# Patient Record
Sex: FEMALE | Race: WHITE | NOT HISPANIC OR LATINO | Employment: FULL TIME | URBAN - METROPOLITAN AREA
[De-identification: names, ages, dates, MRNs, and addresses within clinical notes are randomized per-mention and may not be internally consistent; named-entity substitution may affect disease eponyms.]

---

## 2017-02-17 ENCOUNTER — OFFICE VISIT (OUTPATIENT)
Dept: FAMILY MEDICINE CLINIC | Facility: CLINIC | Age: 42
End: 2017-02-17

## 2017-02-17 VITALS
TEMPERATURE: 97.6 F | WEIGHT: 293 LBS | HEART RATE: 94 BPM | HEIGHT: 70 IN | SYSTOLIC BLOOD PRESSURE: 140 MMHG | DIASTOLIC BLOOD PRESSURE: 88 MMHG | BODY MASS INDEX: 41.95 KG/M2 | RESPIRATION RATE: 20 BRPM | OXYGEN SATURATION: 96 %

## 2017-02-17 DIAGNOSIS — J30.1 SEASONAL ALLERGIC RHINITIS DUE TO POLLEN: ICD-10-CM

## 2017-02-17 DIAGNOSIS — J45.41 MODERATE PERSISTENT ASTHMA WITH ACUTE EXACERBATION: Primary | ICD-10-CM

## 2017-02-17 DIAGNOSIS — E78.2 MIXED HYPERLIPIDEMIA: ICD-10-CM

## 2017-02-17 DIAGNOSIS — I82.402 DEEP VEIN THROMBOSIS (DVT) OF LEFT LOWER EXTREMITY, UNSPECIFIED CHRONICITY, UNSPECIFIED VEIN (HCC): ICD-10-CM

## 2017-02-17 PROBLEM — J45.40 MODERATE PERSISTENT ASTHMA: Status: ACTIVE | Noted: 2017-02-17

## 2017-02-17 PROBLEM — I26.99 ACUTE PULMONARY EMBOLISM (HCC): Status: ACTIVE | Noted: 2017-02-17

## 2017-02-17 PROCEDURE — 99204 OFFICE O/P NEW MOD 45 MIN: CPT | Performed by: INTERNAL MEDICINE

## 2017-02-17 RX ORDER — ALBUTEROL SULFATE 90 UG/1
2 AEROSOL, METERED RESPIRATORY (INHALATION) EVERY 4 HOURS PRN
Qty: 1 INHALER | Refills: 3
Start: 2017-02-17

## 2017-02-17 RX ORDER — ACETAMINOPHEN 325 MG/1
650 TABLET ORAL EVERY 6 HOURS PRN
COMMUNITY

## 2017-02-17 NOTE — PROGRESS NOTES
Subjective   Rosio Jones is a 41 y.o. female.     History of Present Illness   Patient was seen as follow-up from hospital visit for severe asthma.  Overall records were obtained.  She was given a albuterol inhaler and asked to follow-up in our office.  Patient was stable and not wheezing at present time and pulmonary function test she will follow-up 4 days after testing.    She was on birth control pills and had a DVT with PE 10 years ago but has not had problems since.  He does have seasonal allergies and also pretty severe at times and is being set up with an allergist.    Much of this encounter note is an electronic transcription/translation of spoken language to printed text.  The electronic translation of spoken language may permit erroneous, or at times, nonsensical words or phrases to be inadvertently transcribed.  Although I have reviewed the note for such errors, some may still exist.  The following portions of the patient's history were reviewed and updated as appropriate: allergies, current medications, past family history, past medical history, past social history, past surgical history and problem list.    Review of Systems   Constitutional: Negative for fatigue and fever.   HENT: Positive for congestion. Negative for trouble swallowing.    Eyes: Negative for discharge and visual disturbance.   Respiratory: Positive for shortness of breath and wheezing. Negative for choking.    Cardiovascular: Negative for chest pain and palpitations.   Gastrointestinal: Negative for abdominal pain and blood in stool.   Endocrine: Negative.    Genitourinary: Negative for genital sores and hematuria.   Musculoskeletal: Negative for gait problem and joint swelling.   Skin: Negative for color change, pallor, rash and wound.   Allergic/Immunologic: Positive for environmental allergies. Negative for immunocompromised state.   Neurological: Negative for facial asymmetry and speech difficulty.   Psychiatric/Behavioral:  Negative for hallucinations and suicidal ideas.       Objective   Physical Exam   Constitutional: She is oriented to person, place, and time. She appears well-developed and well-nourished. No distress.   HENT:   Head: Normocephalic and atraumatic.   Right Ear: External ear normal.   Left Ear: External ear normal.   Nose: Nose normal.   Mouth/Throat: Oropharynx is clear and moist. No oropharyngeal exudate.   Eyes: Conjunctivae and EOM are normal. Pupils are equal, round, and reactive to light. Right eye exhibits no discharge. Left eye exhibits no discharge. No scleral icterus.   Neck: Normal range of motion. Neck supple. No JVD present. No tracheal deviation present. No thyromegaly present.   Cardiovascular: Normal rate, regular rhythm and normal heart sounds.  Exam reveals no gallop and no friction rub.    No murmur heard.  Pulmonary/Chest: Effort normal and breath sounds normal. No stridor. No respiratory distress. She has no wheezes. She has no rales. She exhibits no tenderness.   Abdominal: Soft. Bowel sounds are normal. She exhibits no distension and no mass. There is no rebound and no guarding. No hernia.   Musculoskeletal: Normal range of motion. She exhibits no edema, tenderness or deformity.   Lymphadenopathy:     She has no cervical adenopathy.   Neurological: She is alert and oriented to person, place, and time. She has normal reflexes. She displays normal reflexes. No cranial nerve deficit. She exhibits normal muscle tone. Coordination normal.   Skin: Skin is warm and dry. No rash noted. She is not diaphoretic. No erythema. No pallor.   Psychiatric: She has a normal mood and affect. Her behavior is normal. Judgment and thought content normal.   Nursing note and vitals reviewed.      Assessment/Plan   Problems Addressed this Visit        Cardiovascular and Mediastinum    Mixed hyperlipidemia    Relevant Orders    CBC & Differential    Comprehensive Metabolic Panel    Lipid Panel       Respiratory     Moderate persistent asthma - Primary    Relevant Medications    albuterol (PROVENTIL HFA;VENTOLIN HFA) 108 (90 BASE) MCG/ACT inhaler    Other Relevant Orders    CBC & Differential    Comprehensive Metabolic Panel    Lipid Panel    Ambulatory Referral to Allergy (Completed)    Ambulatory Referral to Pulmonology (Completed)       Other    Deep vein thrombosis (DVT) of left lower extremity    Relevant Orders    CBC & Differential    Comprehensive Metabolic Panel    Lipid Panel      Other Visit Diagnoses     Seasonal allergic rhinitis due to pollen        Relevant Orders    Ambulatory Referral to Allergy (Completed)

## 2017-02-25 ENCOUNTER — LAB (OUTPATIENT)
Dept: FAMILY MEDICINE CLINIC | Facility: CLINIC | Age: 42
End: 2017-02-25

## 2017-02-25 DIAGNOSIS — E78.2 MIXED HYPERLIPIDEMIA: ICD-10-CM

## 2017-02-25 DIAGNOSIS — J45.41 MODERATE PERSISTENT ASTHMA WITH ACUTE EXACERBATION: ICD-10-CM

## 2017-02-25 DIAGNOSIS — I82.402 DEEP VEIN THROMBOSIS (DVT) OF LEFT LOWER EXTREMITY, UNSPECIFIED CHRONICITY, UNSPECIFIED VEIN (HCC): ICD-10-CM

## 2017-02-25 LAB
ALBUMIN SERPL-MCNC: 3.9 G/DL (ref 3.5–5.2)
ALBUMIN/GLOB SERPL: 1.3 G/DL
ALP SERPL-CCNC: 112 U/L (ref 39–117)
ALT SERPL W P-5'-P-CCNC: 37 U/L (ref 1–33)
ANION GAP SERPL CALCULATED.3IONS-SCNC: 12.9 MMOL/L
AST SERPL-CCNC: 38 U/L (ref 1–32)
BILIRUB SERPL-MCNC: 0.3 MG/DL (ref 0.1–1.2)
BUN BLD-MCNC: 11 MG/DL (ref 6–20)
BUN/CREAT SERPL: 18.3 (ref 7–25)
CALCIUM SPEC-SCNC: 9.6 MG/DL (ref 8.6–10.5)
CHLORIDE SERPL-SCNC: 104 MMOL/L (ref 98–107)
CHOLEST SERPL-MCNC: 301 MG/DL (ref 0–200)
CO2 SERPL-SCNC: 26.1 MMOL/L (ref 22–29)
CREAT BLD-MCNC: 0.6 MG/DL (ref 0.57–1)
ERYTHROCYTE [DISTWIDTH] IN BLOOD BY AUTOMATED COUNT: 17.3 % (ref 4.5–15)
GFR SERPL CREATININE-BSD FRML MDRD: 109 ML/MIN/1.73
GLOBULIN UR ELPH-MCNC: 3.1 GM/DL
GLUCOSE BLD-MCNC: 139 MG/DL (ref 65–99)
HCT VFR BLD AUTO: 36.3 % (ref 31–42)
HDLC SERPL-MCNC: 55 MG/DL (ref 40–60)
HGB BLD-MCNC: 11.4 G/DL (ref 12–18)
LDLC SERPL CALC-MCNC: 191 MG/DL (ref 0–100)
LDLC/HDLC SERPL: 3.47 {RATIO}
LYMPHOCYTES # BLD AUTO: 3.1 10*3/MM3 (ref 1.2–3.4)
LYMPHOCYTES NFR BLD AUTO: 37.8 % (ref 21–51)
MCH RBC QN AUTO: 26.1 PG (ref 26.1–33.1)
MCHC RBC AUTO-ENTMCNC: 31.3 G/DL (ref 33–37)
MCV RBC AUTO: 83.4 FL (ref 80–99)
MONOCYTES # BLD AUTO: 0.5 10*3/MM3 (ref 0.1–0.6)
MONOCYTES NFR BLD AUTO: 5.8 % (ref 2–9)
NEUTROPHILS # BLD AUTO: 4.6 10*3/MM3 (ref 1.4–6.5)
NEUTROPHILS NFR BLD AUTO: 56.4 % (ref 42–75)
PLATELET # BLD AUTO: 415 10*3/MM3 (ref 150–450)
PMV BLD AUTO: 6.8 FL (ref 7.1–10.5)
POTASSIUM BLD-SCNC: 4.4 MMOL/L (ref 3.5–5.2)
PROT SERPL-MCNC: 7 G/DL (ref 6–8.5)
RBC # BLD AUTO: 4.36 10*6/MM3 (ref 4–6)
SODIUM BLD-SCNC: 143 MMOL/L (ref 136–145)
TRIGL SERPL-MCNC: 275 MG/DL (ref 0–150)
VLDLC SERPL-MCNC: 55 MG/DL (ref 5–40)
WBC NRBC COR # BLD: 8.2 10*3/MM3 (ref 4.5–10)

## 2017-02-25 PROCEDURE — 80061 LIPID PANEL: CPT | Performed by: INTERNAL MEDICINE

## 2017-02-25 PROCEDURE — 85025 COMPLETE CBC W/AUTO DIFF WBC: CPT | Performed by: INTERNAL MEDICINE

## 2017-02-25 PROCEDURE — 80053 COMPREHEN METABOLIC PANEL: CPT | Performed by: INTERNAL MEDICINE

## 2017-02-27 DIAGNOSIS — E78.2 MIXED HYPERLIPIDEMIA: Primary | ICD-10-CM

## 2017-02-27 DIAGNOSIS — R79.89 ELEVATED LFTS: ICD-10-CM

## 2017-02-27 RX ORDER — ATORVASTATIN CALCIUM 40 MG/1
40 TABLET, FILM COATED ORAL DAILY
Qty: 30 TABLET | Refills: 3 | Status: SHIPPED | OUTPATIENT
Start: 2017-02-27 | End: 2017-11-03

## 2017-03-01 ENCOUNTER — TELEPHONE (OUTPATIENT)
Dept: FAMILY MEDICINE CLINIC | Facility: CLINIC | Age: 42
End: 2017-03-01

## 2017-03-02 ENCOUNTER — TELEPHONE (OUTPATIENT)
Dept: FAMILY MEDICINE CLINIC | Facility: CLINIC | Age: 42
End: 2017-03-02

## 2017-03-02 DIAGNOSIS — E78.2 MIXED HYPERLIPIDEMIA: Primary | ICD-10-CM

## 2017-03-02 NOTE — TELEPHONE ENCOUNTER
----- Message from Lazaro Ackerman MD sent at 3/2/2017  9:14 AM EST -----  We'll recheck in 4 months diet and exercise needs DASH diet and 30 minutes 3-5 days a week of low impact exercise  ----- Message -----     From: Cathy Keith MA     Sent: 3/1/2017   5:16 PM       To: Lazaro Ackerman MD    Patient states her mom had problems with statins, and she also is concerned re: some side effects and she has been on lipitor before and had issuses . Wants to know if she needs to make appt to discuss or you just want her to diet/exercise and recheck????    L/m re diet exercise recheck 4 months healthy heart diet up front.

## 2017-03-06 ENCOUNTER — HOSPITAL ENCOUNTER (OUTPATIENT)
Dept: ULTRASOUND IMAGING | Facility: HOSPITAL | Age: 42
Discharge: HOME | End: 2017-03-06
Admitting: INTERNAL MEDICINE

## 2017-03-06 DIAGNOSIS — R79.89 ELEVATED LFTS: ICD-10-CM

## 2017-03-06 DIAGNOSIS — E78.2 MIXED HYPERLIPIDEMIA: ICD-10-CM

## 2017-03-06 PROCEDURE — 76700 US EXAM ABDOM COMPLETE: CPT

## 2017-03-07 PROBLEM — K76.0 FATTY LIVER: Status: ACTIVE | Noted: 2017-03-07

## 2017-03-07 PROBLEM — K80.20 CHOLELITHIASIS: Status: ACTIVE | Noted: 2017-03-07

## 2017-08-31 ENCOUNTER — OFFICE VISIT (OUTPATIENT)
Dept: FAMILY MEDICINE CLINIC | Facility: CLINIC | Age: 42
End: 2017-08-31

## 2017-08-31 VITALS
BODY MASS INDEX: 41.95 KG/M2 | SYSTOLIC BLOOD PRESSURE: 140 MMHG | HEART RATE: 76 BPM | HEIGHT: 70 IN | WEIGHT: 293 LBS | DIASTOLIC BLOOD PRESSURE: 80 MMHG | OXYGEN SATURATION: 100 %

## 2017-08-31 DIAGNOSIS — E78.2 MIXED HYPERLIPIDEMIA: ICD-10-CM

## 2017-08-31 DIAGNOSIS — I99.8 FLUCTUATING BLOOD PRESSURE: Primary | ICD-10-CM

## 2017-08-31 DIAGNOSIS — J45.40 MODERATE PERSISTENT ASTHMA WITHOUT COMPLICATION: ICD-10-CM

## 2017-08-31 PROCEDURE — 99214 OFFICE O/P EST MOD 30 MIN: CPT | Performed by: INTERNAL MEDICINE

## 2017-11-03 ENCOUNTER — OFFICE VISIT (OUTPATIENT)
Dept: FAMILY MEDICINE CLINIC | Facility: CLINIC | Age: 42
End: 2017-11-03

## 2017-11-03 VITALS
DIASTOLIC BLOOD PRESSURE: 88 MMHG | TEMPERATURE: 98.8 F | HEIGHT: 70 IN | WEIGHT: 293 LBS | BODY MASS INDEX: 41.95 KG/M2 | HEART RATE: 89 BPM | OXYGEN SATURATION: 97 % | SYSTOLIC BLOOD PRESSURE: 152 MMHG

## 2017-11-03 DIAGNOSIS — R05.9 COUGH: ICD-10-CM

## 2017-11-03 DIAGNOSIS — R03.0 ELEVATED BP WITHOUT DIAGNOSIS OF HYPERTENSION: ICD-10-CM

## 2017-11-03 DIAGNOSIS — H66.002 ACUTE SUPPURATIVE OTITIS MEDIA OF LEFT EAR WITHOUT SPONTANEOUS RUPTURE OF TYMPANIC MEMBRANE, RECURRENCE NOT SPECIFIED: ICD-10-CM

## 2017-11-03 DIAGNOSIS — H61.23 BILATERAL IMPACTED CERUMEN: ICD-10-CM

## 2017-11-03 DIAGNOSIS — J06.9 ACUTE URI: Primary | ICD-10-CM

## 2017-11-03 PROCEDURE — 69209 REMOVE IMPACTED EAR WAX UNI: CPT | Performed by: NURSE PRACTITIONER

## 2017-11-03 PROCEDURE — 99214 OFFICE O/P EST MOD 30 MIN: CPT | Performed by: NURSE PRACTITIONER

## 2017-11-03 RX ORDER — FLUTICASONE PROPIONATE 50 MCG
2 SPRAY, SUSPENSION (ML) NASAL DAILY
Qty: 1 BOTTLE | Refills: 12 | Status: SHIPPED | OUTPATIENT
Start: 2017-11-03

## 2017-11-03 RX ORDER — AZITHROMYCIN 250 MG/1
TABLET, FILM COATED ORAL
Qty: 6 TABLET | Refills: 0 | Status: SHIPPED | OUTPATIENT
Start: 2017-11-03 | End: 2019-06-05

## 2017-11-03 RX ORDER — BENZONATATE 100 MG/1
100 CAPSULE ORAL 3 TIMES DAILY PRN
Qty: 21 CAPSULE | Refills: 0 | Status: ON HOLD | OUTPATIENT
Start: 2017-11-03 | End: 2019-06-10

## 2017-11-03 NOTE — PROGRESS NOTES
Subjective   Rosio Jones is a 42 y.o. female.     History of Present Illness   C/o cough, she does have seasonal allergies, she has productive cough with yellow sputum, she states symptoms started about 5 days ago, she thinks she has had fever, but did not take temp, she has chills and sweats,she did have sore throat but has resolved, she does have ear pain, she has sinus pressure, she has noticed wheezing, slightly SOA with coughing. She has tried cold medication OTC.   She has had elevated BP, does not take medication, she has tried dash diet but has a family hx of HTN.   The following portions of the patient's history were reviewed and updated as appropriate: allergies, current medications, past family history, past medical history, past social history, past surgical history and problem list.    Review of Systems   Constitutional: Positive for chills and diaphoresis. Negative for fever.   HENT: Positive for congestion, ear pain, postnasal drip, rhinorrhea, sinus pressure and sore throat. Negative for ear discharge and hearing loss.    Eyes: Negative for pain.   Respiratory: Positive for cough and wheezing. Negative for chest tightness and shortness of breath.    Cardiovascular: Negative for chest pain.   Musculoskeletal: Negative for arthralgias and myalgias.   Skin: Negative for pallor.   Allergic/Immunologic: Positive for environmental allergies.   Neurological: Negative for dizziness, light-headedness and headaches.   All other systems reviewed and are negative.      Objective   Physical Exam   Constitutional: She is oriented to person, place, and time. She appears well-developed and well-nourished.   HENT:   Head: Normocephalic.   Right Ear: Ear canal normal.   Left Ear: Ear canal normal.   Nose: Mucosal edema and rhinorrhea present.   Mouth/Throat: Uvula is midline and mucous membranes are normal. Posterior oropharyngeal erythema present.   bilat cerumen impaction.    Eyes: Pupils are equal, round, and  reactive to light.   Neck: Neck supple.   Cardiovascular: Normal rate, regular rhythm and normal heart sounds.    Pulmonary/Chest: Effort normal and breath sounds normal. She has no decreased breath sounds. She has no wheezes. She has no rhonchi. She has no rales.   Musculoskeletal: Normal range of motion.   Neurological: She is alert and oriented to person, place, and time.   Skin: Skin is warm and dry.   Psychiatric: She has a normal mood and affect. Her behavior is normal. Judgment and thought content normal.   Nursing note and vitals reviewed.  rechecked BP in room 140/90.  Right TM WNL after lavage, left TM erythematous and suppurative.     Assessment/Plan   Rosio was seen today for sinusitis and cough.    Diagnoses and all orders for this visit:    Acute URI    Bilateral impacted cerumen    Cough    Elevated BP without diagnosis of hypertension    Acute suppurative otitis media of left ear without spontaneous rupture of tympanic membrane, recurrence not specified    Other orders  -     azithromycin (ZITHROMAX) 250 MG tablet; Take 2 tablets the first day, then 1 tablet daily for 4 days.  -     benzonatate (TESSALON PERLES) 100 MG capsule; Take 1 capsule by mouth 3 (Three) Times a Day As Needed for Cough.  -     fluticasone (FLONASE) 50 MCG/ACT nasal spray; 2 sprays into each nostril Daily.        Start zpack, take as directed.  Tessalon perles 100mg up to 3x day as needed for cough.  flonase 2 sprays each nostril daily.   bilat ear lavage today, tolerated well.   If symptoms persist 7 days call office.  Encouraged f/u for BP in 1-2 weeks, monitor at home, dash diet. Encouraged regular exercise.   Increase fluid intake, get plenty of rest.   Patient agrees with plan of care and understands instructions. Call if worsening symptoms or any problems or concerns.

## 2017-11-08 ENCOUNTER — OFFICE VISIT (OUTPATIENT)
Dept: FAMILY MEDICINE CLINIC | Facility: CLINIC | Age: 42
End: 2017-11-08

## 2017-11-08 VITALS
HEIGHT: 70 IN | HEART RATE: 98 BPM | SYSTOLIC BLOOD PRESSURE: 132 MMHG | TEMPERATURE: 98.7 F | OXYGEN SATURATION: 96 % | DIASTOLIC BLOOD PRESSURE: 68 MMHG | WEIGHT: 293 LBS | BODY MASS INDEX: 41.95 KG/M2

## 2017-11-08 DIAGNOSIS — R05.9 COUGH: ICD-10-CM

## 2017-11-08 DIAGNOSIS — J40 BRONCHITIS: Primary | ICD-10-CM

## 2017-11-08 LAB
ERYTHROCYTE [DISTWIDTH] IN BLOOD BY AUTOMATED COUNT: 16 % (ref 4.5–15)
HCT VFR BLD AUTO: 38.2 % (ref 31–42)
HGB BLD-MCNC: 11.8 G/DL (ref 12–18)
LYMPHOCYTES # BLD AUTO: 3.3 10*3/MM3 (ref 1.2–3.4)
LYMPHOCYTES NFR BLD AUTO: 28.8 % (ref 21–51)
MCH RBC QN AUTO: 25.9 PG (ref 26.1–33.1)
MCHC RBC AUTO-ENTMCNC: 30.9 G/DL (ref 33–37)
MCV RBC AUTO: 83.9 FL (ref 80–99)
MONOCYTES # BLD AUTO: 0.7 10*3/MM3 (ref 0.1–0.6)
MONOCYTES NFR BLD AUTO: 6.2 % (ref 2–9)
NEUTROPHILS # BLD AUTO: 7.4 10*3/MM3 (ref 1.4–6.5)
NEUTROPHILS NFR BLD AUTO: 65 % (ref 42–75)
PLATELET # BLD AUTO: 494 10*3/MM3 (ref 150–450)
PMV BLD AUTO: 6.8 FL (ref 7.1–10.5)
RBC # BLD AUTO: 4.55 10*6/MM3 (ref 4–6)
WBC NRBC COR # BLD: 11.4 10*3/MM3 (ref 4.5–10)

## 2017-11-08 PROCEDURE — 71020 XR CHEST 2 VW: CPT | Performed by: NURSE PRACTITIONER

## 2017-11-08 PROCEDURE — 85025 COMPLETE CBC W/AUTO DIFF WBC: CPT | Performed by: NURSE PRACTITIONER

## 2017-11-08 PROCEDURE — 36415 COLL VENOUS BLD VENIPUNCTURE: CPT | Performed by: NURSE PRACTITIONER

## 2017-11-08 PROCEDURE — 99213 OFFICE O/P EST LOW 20 MIN: CPT | Performed by: NURSE PRACTITIONER

## 2017-11-08 RX ORDER — CEFUROXIME AXETIL 250 MG/1
250 TABLET ORAL 2 TIMES DAILY
Qty: 20 TABLET | Refills: 0 | Status: SHIPPED | OUTPATIENT
Start: 2017-11-08 | End: 2019-06-05

## 2017-11-08 RX ORDER — CEFUROXIME AXETIL 250 MG/1
250 TABLET ORAL 2 TIMES DAILY
Qty: 20 TABLET | Refills: 0 | Status: CANCELLED | COMMUNITY

## 2017-11-08 NOTE — PROGRESS NOTES
Subjective   Rosio Jones is a 42 y.o. female.     History of Present Illness   Here today for f/u for URI she was seen on 11/3/17, given zpack, left AOM, she states she does not feel better, she has a cough, she states her ear feels better, she still has nasal drainage and cough, she was also tessalon perles and flonase which has helped some. She has a productive cough at times, yellow at times. She has purulent and bloody nasal drainage, she denies fever, but felt chills and sweats at times. She has noticed wheezing. She does not smoke, she does have seasonal allergies. She does have a rescue inhaler. She has used this at home. She does get SOA after cough at times.   The following portions of the patient's history were reviewed and updated as appropriate: allergies, current medications, past family history, past medical history, past social history, past surgical history and problem list.    Review of Systems   Constitutional: Positive for chills and diaphoresis. Negative for fever.   HENT: Positive for congestion, postnasal drip, rhinorrhea and sinus pressure. Negative for ear pain and sore throat.    Respiratory: Positive for cough, shortness of breath and wheezing. Negative for chest tightness.    Cardiovascular: Negative for chest pain.   Musculoskeletal: Negative for arthralgias and myalgias.   Skin: Negative for pallor.   Allergic/Immunologic: Positive for environmental allergies.   Neurological: Negative for dizziness, light-headedness and headaches.   All other systems reviewed and are negative.      Objective   Physical Exam   Constitutional: She is oriented to person, place, and time. She appears well-developed and well-nourished.   HENT:   Head: Normocephalic.   Right Ear: Tympanic membrane and ear canal normal.   Left Ear: Tympanic membrane and ear canal normal.   Nose: Mucosal edema and rhinorrhea present. Right sinus exhibits maxillary sinus tenderness. Left sinus exhibits maxillary sinus  tenderness.   Mouth/Throat: Uvula is midline, oropharynx is clear and moist and mucous membranes are normal.   Eyes: Pupils are equal, round, and reactive to light.   Neck: Normal range of motion. Neck supple.   Cardiovascular: Normal rate, regular rhythm and normal heart sounds.    Pulmonary/Chest: Effort normal and breath sounds normal. No respiratory distress. She has no decreased breath sounds. She has no wheezes. She has no rhonchi. She has no rales.   Musculoskeletal: Normal range of motion.   Lymphadenopathy:     She has no cervical adenopathy.   Neurological: She is alert and oriented to person, place, and time.   Skin: Skin is warm and dry.   Psychiatric: She has a normal mood and affect. Her behavior is normal. Judgment and thought content normal.   Nursing note and vitals reviewed.  cxr 2v in office for cough, no comparison noted, shows NAd, awaiting radiology over read.     Assessment/Plan   Rosio was seen today for uri.    Diagnoses and all orders for this visit:    Bronchitis  -     CBC & Differential  -     CBC Auto Differential  -     XR Chest 2 View    Cough  -     CBC & Differential  -     CBC Auto Differential  -     XR Chest 2 View    Other orders  -     cefuroxime (CEFTIN) 250 MG tablet; Take 1 tablet by mouth 2 (Two) Times a Day. X 10 d        Cbc, WBC 11.4.  Start ceftin 250mg 2x day for 10 days.   Start allegra or zyrtec OTC daily as for drainage.   Cont flonase as prescribed.  cxr today, will call with results.   May try mucinex DM OTC as needed for congestion and cough.  Trial breo 1 puff daily. Cont albuterol inhaler 2 puffs every 4-6 hours as needed for wheezing or SOA.   Increase fluid intake, get plenty of rest.   If symptoms persist 5-7  Days  Call office.   Patient agrees with plan of care and understands instructions. Call if worsening symptoms or any problems or concerns.

## 2017-11-08 NOTE — PATIENT INSTRUCTIONS
Cbc, WBC 11.4.  Start ceftin 250mg 2x day for 10 days.   Start allegra or zyrtec OTC daily as for drainage.   Cont flonase as prescribed.  May try mucinex DM OTC as needed for congestion and cough.  Trial breo 1 puff daily. Cont albuterol inhaler 2 puffs every 4-6 hours as needed for wheezing or SOA.   Increase fluid intake, get plenty of rest.   If symptoms persist 5-7  Days  Call office.   Patient agrees with plan of care and understands instructions. Call if worsening symptoms or any problems or concerns.

## 2017-11-09 ENCOUNTER — TELEPHONE (OUTPATIENT)
Dept: FAMILY MEDICINE CLINIC | Facility: CLINIC | Age: 42
End: 2017-11-09

## 2017-11-09 NOTE — TELEPHONE ENCOUNTER
Pt informed    ----- Message from CESAR Negro sent at 11/9/2017  3:27 PM EST -----  Please call patient with results. cxr is normal, cont abx and f/u if persists.

## 2018-07-09 NOTE — PATIENT INSTRUCTIONS
Start zpack, take as directed.  Tessalon perles 100mg up to 3x day as needed for cough.  flonase 2 sprays each nostril daily.   bilat ear lavage today,   If symptoms persist 7 days call office.  Encouraged f/u for BP in 1-2 weeks, monitor at home, dash diet. Encouraged regular exercise.   Increase fluid intake, get plenty of rest.   Patient agrees with plan of care and understands instructions. Call if worsening symptoms or any problems or concerns.      [Negative] : Genitourinary

## 2019-06-05 RX ORDER — LOSARTAN POTASSIUM AND HYDROCHLOROTHIAZIDE 12.5; 5 MG/1; MG/1
1 TABLET ORAL DAILY
COMMUNITY

## 2019-06-05 RX ORDER — MONTELUKAST SODIUM 10 MG/1
10 TABLET ORAL NIGHTLY
COMMUNITY

## 2019-06-07 ENCOUNTER — ANESTHESIA EVENT (OUTPATIENT)
Dept: PERIOP | Facility: HOSPITAL | Age: 44
End: 2019-06-07

## 2019-06-08 NOTE — H&P
"         [facility] History and Physical    Patient Care Team:  Lazaro Ackerman MD as PCP - General (Internal Medicine)    Chief complaint stones    Subjective     Patient is a 43 y.o. female presents with bilateral ureteral stones hydro colic post stentsstone manipulation  Dysuria urge frreq  Hematuria  No f/c .       Review of Systems   No f/c     Past Medical History:   Diagnosis Date   • Asthma    • Blood clotting tendency (CMS/HCC)     lung 10 years ago/ pt states they believe it was from her Birth control at the time   • Diabetes mellitus (CMS/HCC)    • Hyperlipidemia    • Hypertension    • PONV (postoperative nausea and vomiting)      Past Surgical History:   Procedure Laterality Date   • CYSTOSCOPY W/ URETEROSCOPY  05/29/2019     Family History   Problem Relation Age of Onset   • Cancer Father         lymphonma   • Heart disease Maternal Grandmother    • Hyperlipidemia Maternal Grandmother    • Cancer Maternal Grandfather      Social History     Tobacco Use   • Smoking status: Never Smoker   • Smokeless tobacco: Never Used   Substance Use Topics   • Alcohol use: No   • Drug use: Defer       Meds:  No medications prior to admission.       Allergies:  Dust mite extract; Nicotine; and Pollen extract    Debilities:  none    Objective     Vital Signs     No intake or output data in the 24 hours ending 06/08/19 0737  Flowsheet Rows      First Filed Value   Admission Height  180.3 cm (71\") Documented at 06/05/2019 1033   Admission Weight  136 kg (299 lb) Documented at 06/05/2019 1033           Physical Exam:      General Appearance:    Alert, cooperative, in no acute distress   Head:    Normocephalic, without obvious abnormality, atraumatic   Eyes:            Lids and lashes normal, conjunctivae and sclerae normal, no   icterus, no pallor, corneas clear,    Ears:    Ears appear intact with no abnormalities noted   Throat:   No oral lesions, no thrush, oral mucosa moist   Neck:   No adenopathy, supple, trachea " midline, no thyromegaly,no JVD   Back:     No kyphosis present, no scoliosis present, no skin lesions,       erythema or scars, no tenderness to percussion or                   palpation,   range of motion normal   Lungs:     respirations regular, even and                   unlabored    Heart:    Regular rhythm and normal rate   Breast Exam:    Deferred   Abdomen:     Normal bowel sounds, no masses, no organomegaly, soft        non-tender, non-distended, no guarding, no rebound                 tenderness   Genitalia:    Deferred   Extremities:   Moves all extremities well, no edema, no cyanosis, no              redness   Pulses:   Pulses palpable and equal bilaterally   Skin:   No bleeding, bruising or rash   Lymph nodes:   No palpable adenopathy   Neurologic:   Cranial nerves 2 - 12 grossly intact, sensation intact, DTR        present and equal bilaterally     Results Review:    I reviewed the patient's new clinical results.  Results for orders placed or performed in visit on 11/08/17   CBC Auto Differential   Result Value Ref Range    WBC 11.40 (H) 4.50 - 10.00 10*3/mm3    RBC 4.55 4.00 - 6.00 10*6/mm3    Hemoglobin 11.8 (L) 12.0 - 18.0 g/dL    Hematocrit 38.2 31.0 - 42.0 %    RDW 16.0 (H) 4.5 - 15.0 %    MCV 83.9 80.0 - 99.0 fL    MCH 25.9 (L) 26.1 - 33.1 pg    MCHC 30.9 (L) 33.0 - 37.0 g/dL    MPV 6.8 (L) 7.1 - 10.5 fL    Platelets 494 (H) 150 - 450 10*3/mm3    Neutrophil % 65.0 42.0 - 75.0 %    Lymphocyte % 28.8 21.0 - 51.0 %    Monocyte % 6.2 2.0 - 9.0 %    Neutrophils, Absolute 7.40 (H) 1.40 - 6.50 10*3/mm3    Lymphocytes, Absolute 3.30 1.20 - 3.40 10*3/mm3    Monocytes, Absolute 0.70 (H) 0.10 - 0.60 10*3/mm3        Assessment:  Bilateral ureteral stones stents   Post lt uscope laser 1 cm mid ureteral / prox stone  Rt uvj stone     Plan:    Stent removal andstone manipulation r/b/o discussed      I discussed the patients findings and my recommendations with patient.     Godwin Stringer MD  06/08/19  7:37  AM

## 2019-06-10 ENCOUNTER — HOSPITAL ENCOUNTER (OUTPATIENT)
Facility: HOSPITAL | Age: 44
Setting detail: HOSPITAL OUTPATIENT SURGERY
Discharge: HOME | End: 2019-06-10
Attending: UROLOGY | Admitting: UROLOGY

## 2019-06-10 ENCOUNTER — ANESTHESIA (OUTPATIENT)
Dept: PERIOP | Facility: HOSPITAL | Age: 44
End: 2019-06-10

## 2019-06-10 ENCOUNTER — APPOINTMENT (OUTPATIENT)
Dept: GENERAL RADIOLOGY | Facility: HOSPITAL | Age: 44
End: 2019-06-10

## 2019-06-10 VITALS
HEART RATE: 84 BPM | OXYGEN SATURATION: 96 % | DIASTOLIC BLOOD PRESSURE: 94 MMHG | WEIGHT: 293 LBS | RESPIRATION RATE: 16 BRPM | TEMPERATURE: 97.9 F | HEIGHT: 71 IN | BODY MASS INDEX: 41.02 KG/M2 | SYSTOLIC BLOOD PRESSURE: 138 MMHG

## 2019-06-10 DIAGNOSIS — N20.2 CALCULUS OF KIDNEY AND URETER: ICD-10-CM

## 2019-06-10 PROBLEM — N13.2 URETERAL STONE WITH HYDRONEPHROSIS: Status: ACTIVE | Noted: 2019-06-10

## 2019-06-10 LAB
GLUCOSE BLDC GLUCOMTR-MCNC: 128 MG/DL (ref 70–130)
HCG SERPL QL: NEGATIVE
POTASSIUM BLD-SCNC: 3.9 MMOL/L (ref 3.5–5.2)

## 2019-06-10 PROCEDURE — C1769 GUIDE WIRE: HCPCS | Performed by: UROLOGY

## 2019-06-10 PROCEDURE — 25010000002 MIDAZOLAM PER 1 MG: Performed by: NURSE ANESTHETIST, CERTIFIED REGISTERED

## 2019-06-10 PROCEDURE — 74420 UROGRAPHY RTRGR +-KUB: CPT

## 2019-06-10 PROCEDURE — 25010000002 ONDANSETRON PER 1 MG: Performed by: NURSE ANESTHETIST, CERTIFIED REGISTERED

## 2019-06-10 PROCEDURE — 25010000002 DEXAMETHASONE PER 1 MG: Performed by: NURSE ANESTHETIST, CERTIFIED REGISTERED

## 2019-06-10 PROCEDURE — 82360 CALCULUS ASSAY QUANT: CPT | Performed by: UROLOGY

## 2019-06-10 PROCEDURE — 93005 ELECTROCARDIOGRAM TRACING: CPT | Performed by: ANESTHESIOLOGY

## 2019-06-10 PROCEDURE — C1758 CATHETER, URETERAL: HCPCS | Performed by: UROLOGY

## 2019-06-10 PROCEDURE — 84703 CHORIONIC GONADOTROPIN ASSAY: CPT | Performed by: NURSE ANESTHETIST, CERTIFIED REGISTERED

## 2019-06-10 PROCEDURE — 25010000002 IOPAMIDOL 61 % SOLUTION: Performed by: UROLOGY

## 2019-06-10 PROCEDURE — 93010 ELECTROCARDIOGRAM REPORT: CPT | Performed by: INTERNAL MEDICINE

## 2019-06-10 PROCEDURE — 82962 GLUCOSE BLOOD TEST: CPT

## 2019-06-10 PROCEDURE — 25010000002 CEFTRIAXONE PER 250 MG: Performed by: NURSE ANESTHETIST, CERTIFIED REGISTERED

## 2019-06-10 PROCEDURE — 25010000002 PROPOFOL 10 MG/ML EMULSION: Performed by: NURSE ANESTHETIST, CERTIFIED REGISTERED

## 2019-06-10 PROCEDURE — 84132 ASSAY OF SERUM POTASSIUM: CPT | Performed by: NURSE ANESTHETIST, CERTIFIED REGISTERED

## 2019-06-10 PROCEDURE — 25010000002 FENTANYL CITRATE (PF) 100 MCG/2ML SOLUTION: Performed by: NURSE ANESTHETIST, CERTIFIED REGISTERED

## 2019-06-10 RX ORDER — ONDANSETRON 2 MG/ML
4 INJECTION INTRAMUSCULAR; INTRAVENOUS ONCE AS NEEDED
Status: COMPLETED | OUTPATIENT
Start: 2019-06-10 | End: 2019-06-10

## 2019-06-10 RX ORDER — SODIUM CHLORIDE, SODIUM LACTATE, POTASSIUM CHLORIDE, CALCIUM CHLORIDE 600; 310; 30; 20 MG/100ML; MG/100ML; MG/100ML; MG/100ML
9 INJECTION, SOLUTION INTRAVENOUS CONTINUOUS
Status: DISCONTINUED | OUTPATIENT
Start: 2019-06-10 | End: 2019-06-10 | Stop reason: HOSPADM

## 2019-06-10 RX ORDER — DIPHENHYDRAMINE HYDROCHLORIDE 50 MG/ML
12.5 INJECTION INTRAMUSCULAR; INTRAVENOUS
Status: DISCONTINUED | OUTPATIENT
Start: 2019-06-10 | End: 2019-06-10 | Stop reason: HOSPADM

## 2019-06-10 RX ORDER — DEXAMETHASONE SODIUM PHOSPHATE 4 MG/ML
8 INJECTION, SOLUTION INTRA-ARTICULAR; INTRALESIONAL; INTRAMUSCULAR; INTRAVENOUS; SOFT TISSUE ONCE
Status: COMPLETED | OUTPATIENT
Start: 2019-06-10 | End: 2019-06-10

## 2019-06-10 RX ORDER — SODIUM CHLORIDE 9 MG/ML
40 INJECTION, SOLUTION INTRAVENOUS AS NEEDED
Status: DISCONTINUED | OUTPATIENT
Start: 2019-06-10 | End: 2019-06-10 | Stop reason: HOSPADM

## 2019-06-10 RX ORDER — FENTANYL CITRATE 50 UG/ML
INJECTION, SOLUTION INTRAMUSCULAR; INTRAVENOUS AS NEEDED
Status: DISCONTINUED | OUTPATIENT
Start: 2019-06-10 | End: 2019-06-10 | Stop reason: SURG

## 2019-06-10 RX ORDER — PROPOFOL 10 MG/ML
VIAL (ML) INTRAVENOUS AS NEEDED
Status: DISCONTINUED | OUTPATIENT
Start: 2019-06-10 | End: 2019-06-10 | Stop reason: SURG

## 2019-06-10 RX ORDER — CEFTRIAXONE 1 G/1
INJECTION, POWDER, FOR SOLUTION INTRAMUSCULAR; INTRAVENOUS AS NEEDED
Status: DISCONTINUED | OUTPATIENT
Start: 2019-06-10 | End: 2019-06-10 | Stop reason: SURG

## 2019-06-10 RX ORDER — MECLIZINE HCL 12.5 MG/1
25 TABLET ORAL 3 TIMES DAILY PRN
COMMUNITY

## 2019-06-10 RX ORDER — MIDAZOLAM HYDROCHLORIDE 1 MG/ML
1 INJECTION INTRAMUSCULAR; INTRAVENOUS
Status: DISCONTINUED | OUTPATIENT
Start: 2019-06-10 | End: 2019-06-10 | Stop reason: HOSPADM

## 2019-06-10 RX ORDER — SCOLOPAMINE TRANSDERMAL SYSTEM 1 MG/1
1 PATCH, EXTENDED RELEASE TRANSDERMAL CONTINUOUS
Status: DISCONTINUED | OUTPATIENT
Start: 2019-06-10 | End: 2019-06-10 | Stop reason: HOSPADM

## 2019-06-10 RX ORDER — MAGNESIUM HYDROXIDE 1200 MG/15ML
LIQUID ORAL AS NEEDED
Status: DISCONTINUED | OUTPATIENT
Start: 2019-06-10 | End: 2019-06-10 | Stop reason: HOSPADM

## 2019-06-10 RX ORDER — MEPERIDINE HYDROCHLORIDE 25 MG/ML
12.5 INJECTION INTRAMUSCULAR; INTRAVENOUS; SUBCUTANEOUS
Status: DISCONTINUED | OUTPATIENT
Start: 2019-06-10 | End: 2019-06-10 | Stop reason: HOSPADM

## 2019-06-10 RX ORDER — LIDOCAINE HYDROCHLORIDE 20 MG/ML
INJECTION, SOLUTION INFILTRATION; PERINEURAL AS NEEDED
Status: DISCONTINUED | OUTPATIENT
Start: 2019-06-10 | End: 2019-06-10 | Stop reason: SURG

## 2019-06-10 RX ORDER — ACETAMINOPHEN 325 MG/1
650 TABLET ORAL ONCE AS NEEDED
Status: DISCONTINUED | OUTPATIENT
Start: 2019-06-10 | End: 2019-06-10 | Stop reason: HOSPADM

## 2019-06-10 RX ORDER — LIDOCAINE HYDROCHLORIDE 10 MG/ML
0.5 INJECTION, SOLUTION EPIDURAL; INFILTRATION; INTRACAUDAL; PERINEURAL ONCE AS NEEDED
Status: COMPLETED | OUTPATIENT
Start: 2019-06-10 | End: 2019-06-10

## 2019-06-10 RX ORDER — PHENAZOPYRIDINE HYDROCHLORIDE 100 MG/1
100 TABLET, FILM COATED ORAL ONCE
Status: COMPLETED | OUTPATIENT
Start: 2019-06-10 | End: 2019-06-10

## 2019-06-10 RX ORDER — SODIUM CHLORIDE 0.9 % (FLUSH) 0.9 %
1-10 SYRINGE (ML) INJECTION AS NEEDED
Status: DISCONTINUED | OUTPATIENT
Start: 2019-06-10 | End: 2019-06-10 | Stop reason: HOSPADM

## 2019-06-10 RX ORDER — MIDAZOLAM HYDROCHLORIDE 1 MG/ML
2 INJECTION INTRAMUSCULAR; INTRAVENOUS
Status: DISCONTINUED | OUTPATIENT
Start: 2019-06-10 | End: 2019-06-10 | Stop reason: HOSPADM

## 2019-06-10 RX ORDER — HYDROCODONE BITARTRATE AND ACETAMINOPHEN 5; 325 MG/1; MG/1
1 TABLET ORAL EVERY 6 HOURS PRN
COMMUNITY

## 2019-06-10 RX ORDER — OXYCODONE AND ACETAMINOPHEN 7.5; 325 MG/1; MG/1
1 TABLET ORAL ONCE AS NEEDED
Status: DISCONTINUED | OUTPATIENT
Start: 2019-06-10 | End: 2019-06-10 | Stop reason: HOSPADM

## 2019-06-10 RX ORDER — ACETAMINOPHEN 650 MG/1
650 SUPPOSITORY RECTAL ONCE AS NEEDED
Status: DISCONTINUED | OUTPATIENT
Start: 2019-06-10 | End: 2019-06-10 | Stop reason: HOSPADM

## 2019-06-10 RX ADMIN — SODIUM CHLORIDE, POTASSIUM CHLORIDE, SODIUM LACTATE AND CALCIUM CHLORIDE: 600; 310; 30; 20 INJECTION, SOLUTION INTRAVENOUS at 12:54

## 2019-06-10 RX ADMIN — MIDAZOLAM HYDROCHLORIDE 1 MG: 1 INJECTION, SOLUTION INTRAMUSCULAR; INTRAVENOUS at 12:49

## 2019-06-10 RX ADMIN — SCOPALAMINE 1 PATCH: 1 PATCH, EXTENDED RELEASE TRANSDERMAL at 12:44

## 2019-06-10 RX ADMIN — ONDANSETRON 4 MG: 2 INJECTION, SOLUTION INTRAMUSCULAR; INTRAVENOUS at 12:44

## 2019-06-10 RX ADMIN — FENTANYL CITRATE 25 MCG: 50 INJECTION, SOLUTION INTRAMUSCULAR; INTRAVENOUS at 13:25

## 2019-06-10 RX ADMIN — DEXAMETHASONE SODIUM PHOSPHATE 8 MG: 4 INJECTION, SOLUTION INTRAMUSCULAR; INTRAVENOUS at 12:44

## 2019-06-10 RX ADMIN — ONDANSETRON 4 MG: 2 INJECTION, SOLUTION INTRAMUSCULAR; INTRAVENOUS at 14:24

## 2019-06-10 RX ADMIN — LIDOCAINE HYDROCHLORIDE 0.5 ML: 10 INJECTION, SOLUTION EPIDURAL; INFILTRATION; INTRACAUDAL; PERINEURAL at 11:58

## 2019-06-10 RX ADMIN — PROPOFOL 150 MG: 10 INJECTION, EMULSION INTRAVENOUS at 13:00

## 2019-06-10 RX ADMIN — FENTANYL CITRATE 25 MCG: 50 INJECTION, SOLUTION INTRAMUSCULAR; INTRAVENOUS at 12:59

## 2019-06-10 RX ADMIN — FAMOTIDINE 20 MG: 10 INJECTION, SOLUTION INTRAVENOUS at 12:44

## 2019-06-10 RX ADMIN — LIDOCAINE HYDROCHLORIDE 80 MG: 20 INJECTION, SOLUTION INFILTRATION; PERINEURAL at 12:59

## 2019-06-10 RX ADMIN — CEFTRIAXONE 1 G: 1 INJECTION, POWDER, FOR SOLUTION INTRAMUSCULAR; INTRAVENOUS at 12:55

## 2019-06-10 RX ADMIN — PHENAZOPYRIDINE HYDROCHLORIDE 100 MG: 100 TABLET ORAL at 14:09

## 2019-06-10 NOTE — OP NOTE
Preop diagnosis bilateral ureteral stones post stone manipulation and stent placement now for definitive therapy    Postoperative diagnosis stones fragmented basket extraction retrograde showing no obstruction    Operative procedure cystoscopy bilateral ureteral stent removal bilateral ureteroscopy and basket extraction stones and bilateral retrograde pyelograms    Surgeon Myke    Anesthesia General    Procedure note 43-year-old presenting herself with bilateral obstructing stones including a large stone in the left mid to proximal ureter requiring fragmentation by laser with stents placed on both placed on both sides now to undergo definitive therapy she has been on oral antibiotic therapy since then given Rocephin perioperatively today    Site was marked antibodies were given timeout was taken after general anesthesia was placed very carefully modified lithotomy position with all pressure points lesion is prepped draped sterile fashion genitalia 2% intraurethral Xylocaine jelly was administered scope was advanced the bladder the trigone and bladder was normal except for the stents I first remove the left and then follow this up with a guidewire into the area where the large stone is been fragmented smaller fragments that were seen were removed some sent for chemical analysis    Retrograde pyelogram showing Apsley no filling defects or hydronephrosis excellent emptying on that side no further stent was placed    Attention then towards the right side stent was grasped dilated ureteroscope was advanced all the way up to the ureteropelvic junction small stone that was seen was also captured with the basket removed and sent for chemical analysis    Again a pullout retrograde pyelogram showing no hydronephrosis with excellent emptying on that side as well therefore no further stents were placed the bladder is a partially filled start procedure well was sent to the recovery in satisfactory condition.  Disposition no  further prescription will be written for me by me today she will follow back up in about a month for the results of stone analysis as well as a renal ultrasound to make sure that the healing has continued to be improved with no silent obstruction office and answering service numbers given concerns and questions call us with a phone number given my office to make arrangements for follow-up appointment in 1 month

## 2019-06-10 NOTE — ANESTHESIA PREPROCEDURE EVALUATION
Anesthesia Evaluation     Patient summary reviewed and Nursing notes reviewed   history of anesthetic complications: PONV prolonged sedation  NPO Solid Status: > 8 hours  NPO Liquid Status: > 2 hours           Airway   Mallampati: III  TM distance: >3 FB  Neck ROM: full  Possible difficult intubation  Dental    (+) poor dentition        Pulmonary     breath sounds clear to auscultation  (+) pulmonary embolism (2009), asthma,   Cardiovascular   Exercise tolerance: good (4-7 METS)    ECG reviewed  Rhythm: regular  Rate: normal    (+) hypertension well controlled less than 2 medications, PVD, DVT resolved, hyperlipidemia,     ROS comment: Pt has had syncope and is being worked up for possible irregular heart rhythmn      Narrative     HEART RATE= 82  bpm  RR Interval= 728  ms  MD Interval= 187  ms  P Horizontal Axis= 3  deg  P Front Axis= 33  deg  QRSD Interval= 109  ms  QT Interval= 401  ms  QRS Axis= -29  deg  T Wave Axis= 6  deg  - BORDERLINE ECG -  Sinus rhythm  Probable left ventricular hypertrophy  Electronically Signed By:   Date and Time of Study: 2019-06-10 11:49:59        Neuro/Psych  (+) dizziness/light headedness, weakness (generalized ),     GI/Hepatic/Renal/Endo    (+) morbid obesity,  liver disease fatty liver disease, renal disease stones, diabetes mellitus type 2 well controlled,     Musculoskeletal     (+) back pain (LBP),   Abdominal   (+) obese,    Substance History - negative use     OB/GYN          Other - negative ROS       ROS/Med Hx Other: 0845 water                Anesthesia Plan    ASA 3     general     intravenous induction   Anesthetic plan, all risks, benefits, and alternatives have been provided, discussed and informed consent has been obtained with: patient.  Use of blood products discussed with patient  Consented to blood products.

## 2019-06-10 NOTE — ANESTHESIA POSTPROCEDURE EVALUATION
Patient: Rosio Reeber    Procedure Summary     Date:  06/10/19 Room / Location:  [facility] OR 4 /  [facility] OR    Anesthesia Start:  1254 Anesthesia Stop:  1342    Procedure:  BILATERAL URETEROSCOPY, STONE BASKET EXTRACTION & bilateral stent removal, bilateral retrogrades (Bilateral Ureter) Diagnosis:  (N20.2)    Surgeon:  Godwin Stringer MD Provider:  Tom Ruvalcaba CRNA    Anesthesia Type:  general ASA Status:  3          Anesthesia Type: general  Last vitals  BP   145/99 (06/10/19 1440)   Temp   97.9 °F (36.6 °C) (06/10/19 1415)   Pulse   79 (06/10/19 1440)   Resp   16 (06/10/19 1440)     SpO2   94 % (06/10/19 1440)     Post Anesthesia Care and Evaluation    Patient location during evaluation: bedside  Patient participation: complete - patient participated  Level of consciousness: awake and alert  Pain score: 0  Pain management: adequate  Airway patency: patent  Anesthetic complications: No anesthetic complications  PONV Status: none  Cardiovascular status: acceptable  Respiratory status: acceptable  Hydration status: acceptable

## 2019-06-10 NOTE — NURSING NOTE
Patient states still a little nauseated< but not vomiting. Anesthesia aware. Patient ok to discharge per anesthesia.

## 2019-06-10 NOTE — INTERVAL H&P NOTE
"H&P reviewed. The patient was examined and there are no changes to the H&P.      /93 (BP Location: Left arm, Patient Position: Lying)   Pulse 85   Temp 98 °F (36.7 °C) (Oral)   Resp 16   Ht 180.3 cm (71\")   Wt 133 kg (293 lb)   LMP 05/27/2019   SpO2 95%   BMI 40.87 kg/m²   "

## 2019-06-10 NOTE — BRIEF OP NOTE
CYSTOSCOPY LITHOTRIPSY WITH LASER  Progress Note    Rosio Reeber  6/10/2019    Pre-op Diagnosis:   N20.2       Post-Op Diagnosis Codes:   bilateral ureteral stones     Procedure/CPT® Codes:      Procedure(s):  BILATERAL URETEROSCOPY, STONE BASKET EXTRACTION & bilateral stent removal, bilateral retrogrades    Surgeon(s):  Godwin Stringer MD    Anesthesia: General    Staff:   Circulator: Dayan Sigala RN  Scrub Person: Abby Araujo    Estimated Blood Loss: none    Urine Voided: * No values recorded between 6/10/2019 12:52 PM and 6/10/2019  1:31 PM *    Specimens:                Specimens     ID Source Type Tests Collected By Collected At Frozen?      1 Ureter, Right Calculus · STONE ANALYSIS   Godwin Stringer MD 6/10/19 [id number]      Description: Right ureter stone                Drains:      Findings: stones extracted normal retrogrades     Complications: none      Godwin Stringer MD     Date: 6/10/2019  Time: 1:31 PM

## 2019-06-10 NOTE — ANESTHESIA PROCEDURE NOTES
Airway  Urgency: elective    Airway not difficult    General Information and Staff    Patient location during procedure: OR  CRNA: Tom Ruvalcaba CRNA    Indications and Patient Condition  Indications for airway management: airway protection    Preoxygenated: yes  MILS maintained throughout  Mask difficulty assessment: 1 - vent by mask    Final Airway Details  Final airway type: supraglottic airway      Successful airway: classic  Size 3    Number of attempts at approach: 1    Additional Comments  To OR, monitors and O2 on. Smooth IV ind. - LMA inserted  x 1 attempt + BBS. Tape OU. Pressure points checked and padded

## 2019-06-14 LAB
COLOR STONE: NORMAL
COM CRY STONE QL IR: 2 %
COMPN STONE: NORMAL
Lab: NORMAL
Lab: NORMAL
NA URATE MFR STONE IR: 98 %
NIDUS STONE QL: NORMAL
PATH REPORT.COMMENTS IMP SPEC: NORMAL
SIZE STONE: NORMAL MM
WT STONE: 3.6 MG

## 2021-03-27 NOTE — TELEPHONE ENCOUNTER
----- Message from Lazaor Ackerman MD sent at 2/27/2017  2:24 PM EST -----  Elevated LFTs and elevated LDL recommend statin and labs in one in 3 months and also ultrasound right upper quadrant    Left message on v/m re labs,diet/exercise/meds recheck   You can access the FollowMyHealth Patient Portal offered by [facility] by registering at the following website: [email or web address]. By joining [facility]’s FollowMyHealth portal, you will also be able to view your health information using other applications (apps) compatible with our system.

## (undated) DEVICE — TRANSPOSAL ULTRAFLEX DUO/QUAD ULTRA CART MANIFOLD

## (undated) DEVICE — NITINOL WIRE WITH HYDROPHILIC TIP: Brand: SENSOR

## (undated) DEVICE — PK CYSTO 90

## (undated) DEVICE — GLV SURG SENSICARE MICRO PF LF 7.5 STRL

## (undated) DEVICE — CATH URETRL FLXITP POLLACK STD 5F 70CM

## (undated) DEVICE — CONTAINER,SPECIMEN,OR STERILE,4OZ: Brand: MEDLINE

## (undated) DEVICE — URETERAL DILATATION SYSTEM

## (undated) DEVICE — 30210 SINGLE USE ARM STRAP: Brand: 30210 SINGLE USE ARM STRAP

## (undated) DEVICE — NITINOL STONE RETRIEVAL BASKET: Brand: ZERO TIP